# Patient Record
Sex: FEMALE
[De-identification: names, ages, dates, MRNs, and addresses within clinical notes are randomized per-mention and may not be internally consistent; named-entity substitution may affect disease eponyms.]

---

## 2018-02-22 ENCOUNTER — HOSPITAL ENCOUNTER (EMERGENCY)
Dept: HOSPITAL 92 - ERS | Age: 53
Discharge: HOME | End: 2018-02-22

## 2018-02-22 DIAGNOSIS — F31.9: ICD-10-CM

## 2018-02-22 DIAGNOSIS — F41.9: ICD-10-CM

## 2018-02-22 DIAGNOSIS — J40: Primary | ICD-10-CM

## 2018-02-22 DIAGNOSIS — I10: ICD-10-CM

## 2018-02-22 PROCEDURE — 94640 AIRWAY INHALATION TREATMENT: CPT

## 2018-02-22 PROCEDURE — 93005 ELECTROCARDIOGRAM TRACING: CPT

## 2018-02-22 PROCEDURE — 71045 X-RAY EXAM CHEST 1 VIEW: CPT

## 2018-02-22 NOTE — RAD
TWO SEPARATE AP VIEWS OF THE CHEST

2/22/18

 

INDICATION:

Cough.

 

COMPARISON:  

Prior exam dated 6/6/16.

 

FINDINGS/IMPRESSION:    

No consolidation is evident. Cardiomediastinal silhouette is within normal limits. No acute osseous a
bnormality is evident. The exam is compared to a prior dated 6/6/16. 

 

POS: [facility]